# Patient Record
Sex: FEMALE | Race: WHITE | Employment: FULL TIME
[De-identification: names, ages, dates, MRNs, and addresses within clinical notes are randomized per-mention and may not be internally consistent; named-entity substitution may affect disease eponyms.]

---

## 2024-03-23 ENCOUNTER — HOSPITAL ENCOUNTER (EMERGENCY)
Facility: HOSPITAL | Age: 34
Discharge: HOME | End: 2024-03-23
Payer: MEDICAID

## 2024-03-23 VITALS
HEART RATE: 82 BPM | TEMPERATURE: 97.5 F | DIASTOLIC BLOOD PRESSURE: 84 MMHG | WEIGHT: 150 LBS | SYSTOLIC BLOOD PRESSURE: 131 MMHG | HEIGHT: 63 IN | BODY MASS INDEX: 26.58 KG/M2 | OXYGEN SATURATION: 100 % | RESPIRATION RATE: 18 BRPM

## 2024-03-23 DIAGNOSIS — F11.90 METHADONE USE: Primary | ICD-10-CM

## 2024-03-23 PROCEDURE — 6370000000 HC RX 637 (ALT 250 FOR IP): Performed by: NURSE PRACTITIONER

## 2024-03-23 PROCEDURE — 99283 EMERGENCY DEPT VISIT LOW MDM: CPT

## 2024-03-23 RX ORDER — ONDANSETRON 4 MG/1
4 TABLET, ORALLY DISINTEGRATING ORAL
Status: COMPLETED | OUTPATIENT
Start: 2024-03-23 | End: 2024-03-23

## 2024-03-23 RX ORDER — METHADONE HYDROCHLORIDE 10 MG/ML
75 CONCENTRATE ORAL
Status: COMPLETED | OUTPATIENT
Start: 2024-03-23 | End: 2024-03-23

## 2024-03-23 RX ADMIN — ONDANSETRON 4 MG: 4 TABLET, ORALLY DISINTEGRATING ORAL at 10:28

## 2024-03-23 RX ADMIN — METHADONE HYDROCHLORIDE 75 MG: 10 CONCENTRATE ORAL at 12:10

## 2024-03-23 NOTE — ED TRIAGE NOTES
States she missed her methadone appointment today needs dosage, she is having hot and cold flashes, denies any pain, states she feels nausea but denies any emesis

## 2024-03-24 ENCOUNTER — HOSPITAL ENCOUNTER (EMERGENCY)
Facility: HOSPITAL | Age: 34
Discharge: HOME | End: 2024-03-24
Payer: MEDICAID

## 2024-03-24 VITALS
RESPIRATION RATE: 19 BRPM | OXYGEN SATURATION: 98 % | DIASTOLIC BLOOD PRESSURE: 69 MMHG | HEART RATE: 83 BPM | TEMPERATURE: 97.3 F | SYSTOLIC BLOOD PRESSURE: 107 MMHG

## 2024-03-24 DIAGNOSIS — F11.90 METHADONE USE: Primary | ICD-10-CM

## 2024-03-24 PROCEDURE — 99283 EMERGENCY DEPT VISIT LOW MDM: CPT

## 2024-03-24 PROCEDURE — 6370000000 HC RX 637 (ALT 250 FOR IP): Performed by: NURSE PRACTITIONER

## 2024-03-24 RX ORDER — METHADONE HYDROCHLORIDE 10 MG/ML
75 CONCENTRATE ORAL
Status: COMPLETED | OUTPATIENT
Start: 2024-03-24 | End: 2024-03-24

## 2024-03-24 RX ADMIN — METHADONE HYDROCHLORIDE 75 MG: 10 CONCENTRATE ORAL at 10:51

## 2024-03-24 NOTE — ED TRIAGE NOTES
Pt arrives to ed requesting a dose of methadone. Pt states she takes 75mg every day. Pt was also here yesterday for a dose.

## 2024-03-24 NOTE — ED PROVIDER NOTES
yesterday for her daily dose.  During yesterday's visit, I spoke to Jan Ellsworth (nursing supervisor at [facility]) who confirmed that patient was absent on Saturday and was not given her daily 75 mg dose of liquid methadone.  Patient denies any symptoms at this time.In evaluation of the above differential diagnosis, consideration was given to the following tests and treatments: Patient is in no acute distress, vital signs are stable.  Physical exam is unremarkable and patient denies any complaints or symptoms today.  Patient's normal dose was given.  I discussed each of these tests and considerations with the patient. They agree with the plan of discharge to home with follow-up as scheduled at the treatment center.  Return precautions given.      FINAL IMPRESSION     1. Methadone use            DISPOSITION/PLAN   DISPOSITION Decision To Discharge 03/24/2024 11:02:01 AM           PATIENT REFERRED TO:  [facility]  [address]  [place] [address]  [phone]    Keep appointment as scheduled         DISCHARGE MEDICATIONS:     Medication List      You have not been prescribed any medications.                I am the Primary Clinician of Record.       (Please note that parts of this dictation were completed with voice recognition software. Quite often unanticipated grammatical, syntax, homophones, and other interpretive errors are inadvertently transcribed by the computer software. Please disregards these errors. Please excuse any errors that have escaped final proofreading.)      Mariela Jessica, CARROLL - NP  03/24/24 [id number]

## 2024-04-18 ENCOUNTER — HOSPITAL ENCOUNTER (EMERGENCY)
Facility: HOSPITAL | Age: 34
Discharge: HOME | End: 2024-04-18
Payer: MEDICAID

## 2024-04-18 VITALS
SYSTOLIC BLOOD PRESSURE: 136 MMHG | TEMPERATURE: 98.2 F | HEART RATE: 83 BPM | OXYGEN SATURATION: 100 % | WEIGHT: 155 LBS | RESPIRATION RATE: 16 BRPM | DIASTOLIC BLOOD PRESSURE: 87 MMHG | BODY MASS INDEX: 27.46 KG/M2

## 2024-04-18 DIAGNOSIS — F11.90 METHADONE USE: Primary | ICD-10-CM

## 2024-04-18 PROCEDURE — 6370000000 HC RX 637 (ALT 250 FOR IP): Performed by: NURSE PRACTITIONER

## 2024-04-18 PROCEDURE — 99283 EMERGENCY DEPT VISIT LOW MDM: CPT

## 2024-04-18 RX ORDER — METHADONE HYDROCHLORIDE 10 MG/ML
90 CONCENTRATE ORAL
Status: COMPLETED | OUTPATIENT
Start: 2024-04-18 | End: 2024-04-18

## 2024-04-18 RX ORDER — METHADONE HYDROCHLORIDE 10 MG/5ML
90 SOLUTION ORAL EVERY 4 HOURS PRN
COMMUNITY

## 2024-04-18 RX ADMIN — METHADONE HYDROCHLORIDE 90 MG: 10 CONCENTRATE ORAL at 20:26

## 2024-04-18 NOTE — ED PROVIDER NOTES
[facility] EMERGENCY DEPT  EMERGENCY DEPARTMENT ENCOUNTER       Pt Name: Debbie Chung  MRN: [id number]  Birthdate 1990  Date of evaluation: 4/18/2024  PCP: None, None  Note Started: 8:28 PM 4/18/24     CHIEF COMPLAINT       Chief Complaint   Patient presents with    Medication Refill        HISTORY OF PRESENT ILLNESS: 1 or more elements      History From: Patient  HPI Limitations: None  Chronic Conditions: Methadone use, anxiety  Social Determinants affecting Dx or Tx: None       Debbie Chung is a 33 y.o. female with history of methadone use who presents to ED c/o missing her methadone appointment today.  Patient is treated at [facility], states she takes 90 mg.  Patient reports she feels slightly irritable at this time but denies headache, nausea.     Nursing Notes were all reviewed and agreed with or any disagreements were addressed in the HPI.    PAST HISTORY     Past Medical History:  No past medical history on file.    Past Surgical History:  No past surgical history on file.    Family History:  No family history on file.    Social History:  Social History     Socioeconomic History    Marital status: Single       Allergies:  No Known Allergies    CURRENT MEDICATIONS      No current facility-administered medications for this encounter.     Current Outpatient Medications   Medication Sig Dispense Refill    methadone 10 MG/5ML solution Take 45 mLs by mouth every 4 hours as needed for Pain. Max Daily Amount: 540 mg            PHYSICAL EXAM      Vitals:    04/18/24 1853   BP: 136/87   Pulse: 83   Resp: 16   Temp: 98.2 °F (36.8 °C)   TempSrc: Oral   SpO2: 100%   Weight: 70.3 kg (155 lb)     Physical Exam  Constitutional:       General: She is not in acute distress.     Appearance: Normal appearance. She is not toxic-appearing or diaphoretic.   HENT:      Head: Normocephalic and atraumatic.      Right Ear: External ear normal.      Left Ear: External ear normal.      Nose: Nose normal.

## 2024-08-23 ENCOUNTER — APPOINTMENT (OUTPATIENT)
Facility: HOSPITAL | Age: 34
End: 2024-08-23
Payer: MEDICAID

## 2024-08-23 ENCOUNTER — HOSPITAL ENCOUNTER (EMERGENCY)
Facility: HOSPITAL | Age: 34
Discharge: HOME | End: 2024-08-23
Attending: STUDENT IN AN ORGANIZED HEALTH CARE EDUCATION/TRAINING PROGRAM
Payer: MEDICAID

## 2024-08-23 VITALS
RESPIRATION RATE: 18 BRPM | WEIGHT: 145 LBS | HEART RATE: 81 BPM | BODY MASS INDEX: 25.69 KG/M2 | OXYGEN SATURATION: 98 % | DIASTOLIC BLOOD PRESSURE: 64 MMHG | TEMPERATURE: 98.4 F | HEIGHT: 63 IN | SYSTOLIC BLOOD PRESSURE: 113 MMHG

## 2024-08-23 DIAGNOSIS — S63.502A SPRAIN OF LEFT WRIST, INITIAL ENCOUNTER: Primary | ICD-10-CM

## 2024-08-23 PROCEDURE — 73130 X-RAY EXAM OF HAND: CPT

## 2024-08-23 PROCEDURE — 73110 X-RAY EXAM OF WRIST: CPT

## 2024-08-23 PROCEDURE — 99283 EMERGENCY DEPT VISIT LOW MDM: CPT

## 2024-08-23 RX ORDER — ACETAMINOPHEN 500 MG
500 TABLET ORAL EVERY 6 HOURS PRN
Qty: 28 TABLET | Refills: 0 | Status: SHIPPED | OUTPATIENT
Start: 2024-08-23 | End: 2024-08-30

## 2024-08-23 RX ORDER — NAPROXEN 500 MG/1
500 TABLET ORAL 2 TIMES DAILY PRN
Qty: 20 TABLET | Refills: 0 | Status: SHIPPED | OUTPATIENT
Start: 2024-08-23 | End: 2024-09-02

## 2024-08-23 ASSESSMENT — PAIN DESCRIPTION - LOCATION: LOCATION: ARM

## 2024-08-23 ASSESSMENT — PAIN SCALES - GENERAL: PAINLEVEL_OUTOF10: 5
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
## 2024-08-23 NOTE — ED NOTES
Paperwork read with patient making note of where to  prescriptions .    Armband removed and disposed of in shredder.     Patient has no further questions at this time.     Will discharge from system.
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
